# Patient Record
Sex: FEMALE | Race: OTHER | Employment: OTHER | URBAN - METROPOLITAN AREA
[De-identification: names, ages, dates, MRNs, and addresses within clinical notes are randomized per-mention and may not be internally consistent; named-entity substitution may affect disease eponyms.]

---

## 2024-05-21 PROBLEM — E66.01 SEVERE OBESITY (BMI 35.0-39.9) WITH COMORBIDITY (HCC): Status: ACTIVE | Noted: 2024-05-21

## 2024-06-07 NOTE — PATIENT INSTRUCTIONS
Thank you for coming to see me today   Continue to be active   No changes to your medications   Clearance letter sent for surgery to Dr. Westbrook

## 2024-06-07 NOTE — PROGRESS NOTES
[facility] [place]  [facility]  Cardiology Note  [phone]      No chief complaint on file.       History of Present Illness:  Yoli Mejía is a 43 y.o. patient PMHx obesity. Pt referred to the office by Dr. Westbrook for cardiac risk assessment for upcoming bariatric surgery. I have been asked to provide consultation regarding further management and testing.    Today she is here with her daughter. Use of video  for visit. She states she walks the dog daily, approximately 1 mile, taking 30 minutes. She feels short of breath with walking and the 3 flights of stairs to her apartment but is able to complete without needing to stop for rest. Denies chest pain or pressure.       ID #: [id number]    Past Medical History:   has no past medical history on file.    Surgical History:   has no past surgical history on file.     Social History:   reports that she has never smoked. She has never used smokeless tobacco.     Family History:  Family History   Problem Relation Age of Onset    Hypertension Mother     Mental Illness Mother     Cancer Father        Home Medications:  Were reviewed and are listed in nursing record. and/or listed below  Prior to Admission medications    Medication Sig Start Date End Date Taking? Authorizing Provider   vitamin B-12 (CYANOCOBALAMIN) 1000 MCG tablet Take 1 tablet by mouth daily    Boo Valverde MD   Biotin 95004 MCG TABS Take by mouth    Boo Valverde MD   Magnesium 300 MG CAPS Take by mouth    Boo Valverde MD   Apple Cider Vinegar 600 MG CAPS Take by mouth    Boo Valverde MD        Current Medications:  Current Outpatient Medications   Medication Sig Dispense Refill    vitamin B-12 (CYANOCOBALAMIN) 1000 MCG tablet Take 1 tablet by mouth daily      Biotin 87998 MCG TABS Take by mouth      Magnesium 300 MG CAPS Take by mouth      Apple Cider Vinegar 600 MG CAPS Take by mouth       No current facility-administered 
  Patient will be started on new medication ***.  Patient verbalizes understanding of the need for treatment and education provided at today's visit. Additional education materials will be provided in the AVS.     Scribe***    All questions and concerns were addressed to the patient/family. Alternatives to my treatment were discussed. The note was completed using EMR. Every effort was made to ensure accuracy; however, inadvertent computerized transcription errors may be present.  Mary Alcocer RN, MD 5/23/2024 3:01 PM

## 2024-06-11 ENCOUNTER — OFFICE VISIT (OUTPATIENT)
Age: 44
End: 2024-06-11
Payer: COMMERCIAL

## 2024-06-11 VITALS
HEIGHT: 62 IN | DIASTOLIC BLOOD PRESSURE: 72 MMHG | BODY MASS INDEX: 37.57 KG/M2 | WEIGHT: 204.15 LBS | HEART RATE: 93 BPM | OXYGEN SATURATION: 98 % | SYSTOLIC BLOOD PRESSURE: 126 MMHG

## 2024-06-11 DIAGNOSIS — Z01.818 PREOPERATIVE CLEARANCE: Primary | ICD-10-CM

## 2024-06-11 DIAGNOSIS — E66.01 SEVERE OBESITY (BMI 35.0-39.9) WITH COMORBIDITY (HCC): ICD-10-CM

## 2024-06-11 PROCEDURE — 93000 ELECTROCARDIOGRAM COMPLETE: CPT | Performed by: INTERNAL MEDICINE

## 2024-06-11 PROCEDURE — 99204 OFFICE O/P NEW MOD 45 MIN: CPT | Performed by: INTERNAL MEDICINE

## 2024-06-20 PROBLEM — Z01.818 PREOPERATIVE CLEARANCE: Status: RESOLVED | Noted: 2024-05-21 | Resolved: 2024-06-20

## 2024-06-25 ENCOUNTER — OFFICE VISIT (OUTPATIENT)
Dept: BARIATRICS/WEIGHT MGMT | Age: 44
End: 2024-06-25
Payer: COMMERCIAL

## 2024-06-25 VITALS
WEIGHT: 198 LBS | HEART RATE: 69 BPM | RESPIRATION RATE: 15 BRPM | OXYGEN SATURATION: 97 % | BODY MASS INDEX: 35.08 KG/M2 | DIASTOLIC BLOOD PRESSURE: 85 MMHG | HEIGHT: 63 IN | SYSTOLIC BLOOD PRESSURE: 126 MMHG

## 2024-06-25 DIAGNOSIS — R73.03 PREDIABETES: ICD-10-CM

## 2024-06-25 DIAGNOSIS — E66.01 SEVERE OBESITY (BMI 35.0-39.9) WITH COMORBIDITY (HCC): Primary | ICD-10-CM

## 2024-06-25 PROCEDURE — 1036F TOBACCO NON-USER: CPT | Performed by: SURGERY

## 2024-06-25 PROCEDURE — G8417 CALC BMI ABV UP PARAM F/U: HCPCS | Performed by: SURGERY

## 2024-06-25 PROCEDURE — 99214 OFFICE O/P EST MOD 30 MIN: CPT | Performed by: SURGERY

## 2024-06-25 PROCEDURE — G8427 DOCREV CUR MEDS BY ELIG CLIN: HCPCS | Performed by: SURGERY

## 2024-06-25 RX ORDER — FERROUS SULFATE 325(65) MG
325 TABLET ORAL 2 TIMES DAILY WITH MEALS
Qty: 60 TABLET | Refills: 3 | Status: SHIPPED | OUTPATIENT
Start: 2024-06-25

## 2024-06-25 NOTE — PATIENT INSTRUCTIONS
Patient received dietary handouts and education.    Plan/Recommendations:   - Eat 4 times/day spaced 3 hours apart, start 1 within 2 hours of waking up, refer to snack list for ideas  - Try Protein Powder  - Schedule Support Group

## 2024-06-25 NOTE — PROGRESS NOTES
Yoli Calderon lost 6.2 lbs over 5 weeks. Visits complete via video interpretor Jan #[id number].    Pt reports continues to follow fasting schedule, only eating from 1 PM -8 PM  Breakfast: None - not hungry  Snack: None  Lunch: None  Snack: None   Dinner: 5 PM: Fiber tortilla chix/cheese  Snack: None    Is pt consuming smaller portions?  somewhat    Is pt consuming at least 64 oz of fluids per day?  6 bottles  water    Is pt consuming carbonated, caffeinated, or sugary beverages? Avoiding all juice    Has pt sampled Unjury and/or Nectar protein? Encouraged pt to try grab'n'go's before next appointment.    Has patient attended a support group? Not scheduled     Exercise: walking/active cleaning houses     Plan/Recommendations:   - Eat 4 times/day spaced 3 hours apart, start 1 within 2 hours of waking up, refer to snack list for ideas  - Try Protein Powder  - Schedule Support Group    Handouts: None    Yoana Jacob RD, LD    
mood and affect. Speech is normal and behavior is normal.       A/P    Yoli Calderon is 43 y.o. female, Body mass index is 35.64 kg/m². pre surgery, has lost 6 lbs since last visit. The patient underwent extensive dietary counseling.  I have reviewed, discussed and agree with the dietary plan by the registered dietitian . Patient is trying hard to keep good dietary and behavior modifications. Patient is monitoring portion sizes, food choices and liquid calories.  Patient is trying to exercise regularly as much as possible.    Obesity as a disease is considered a high risk to patients overall health and should therefore be considered a high risk disease state.   Advised the patient that not getting there weight under control, that could increase risk of complications/worsening of those conditions on the long-term. (Goal of weight loss surgery is to alleviate/control some of those co-morbidities)    Now with Covid-19 pandemic, CDC and health authorities does classify obese patients as vulnerable and high risk as well.  Which makes weight loss a priority for improvement of their wellbeing and overall health.     I encouraged the patient to continue exercise and keeping healthy eating habits.  Discussed pre-op labs and work up till now. Also counseled the patient extensively on Surgery.     I did explain thoroughly to the patient that compliance with pre- and post op diet and other recommendations are integral part to improve the chances of successful weight loss and also not following it could end with serious health complications.   Some strategies discussed today include but not limited to : 30/30/30 minutes rule, food diary, avoid fast food and packing/planing ahead, & increasing exercise.    Also stressed to the patient importance of taking the multivitamins as instructed, otherwise risk significant complications.      The visit today, included any number of the following: Bariatric Preoperative work

## 2024-07-12 PROBLEM — E55.9 VITAMIN D DEFICIENCY: Status: ACTIVE | Noted: 2024-07-12

## 2024-07-12 PROBLEM — E61.1 IRON DEFICIENCY: Status: ACTIVE | Noted: 2024-07-12

## 2024-07-12 NOTE — PATIENT INSTRUCTIONS
Goals in preparing for bariatric surgery  You should be giving up all beverages that have carbonation, sugar, and caffeine (Refer to the approved liquids list provided at initial visit).  You should be drinking 64 ounces of low calorie (5 calories or less per serving) fluids per day. Suggestions include:  Water (you may add fresh lemon or lime)  Crystal Light  [place] Liquid Water Enhancer  Propel Zero  Powerade Zero/Gatorade Zero  Isopure  Cnefe4X  SOBE Lifewater Zero  Vitamin Water Zero  Sugar Free Padilla-Aid  You should be eating 4-6 times per day.  Three small meals plus 1-2 snacks per day is your goal. This balances your calories and nutrients evenly throughout the day and helps to boost your metabolism. Refer to the snack list provided at your initial visit. Aim for a protein at every snack, plus a fruit, vegetable or starch.  You should be eating protein at every meal and snack.  Protein is typically found in animal sources, i.e. chicken, lean beef, lean pork, fish, seafood and eggs. It is also found in low-fat dairy sources such as skim or 1% milk, low-fat yogurt, low-fat cheese, and low-fat cottage cheese. Plant based sources of protein include peanut butter, beans, and soy.  You should be utilizing the 9-inch plate method.  Eating on a smaller plate will help you control portion size, but what you put on your plate counts also. Make ¼ of your plate lean protein, ¼ carbohydrate (fruit, grain or starchy vegetables) and ½ the plate non-starchy vegetables.  You should eliminate caffeine.  Caffeine is dehydrating. After surgery, it's very important to stay hydrated. Giving up caffeine before surgery will help you focus on the changes necessary to be successful after surgery. There are many decaffeinated coffee and tea products available in grocery stores.    You should eliminate alcohol.  You must abstain from alcohol prior to surgery and for at least the first 6-9 months after surgery. Although you may have alcohol

## 2024-07-29 ENCOUNTER — OFFICE VISIT (OUTPATIENT)
Dept: BARIATRICS/WEIGHT MGMT | Age: 44
End: 2024-07-29
Payer: COMMERCIAL

## 2024-07-29 VITALS
WEIGHT: 199 LBS | HEIGHT: 63 IN | OXYGEN SATURATION: 96 % | RESPIRATION RATE: 16 BRPM | DIASTOLIC BLOOD PRESSURE: 78 MMHG | SYSTOLIC BLOOD PRESSURE: 120 MMHG | BODY MASS INDEX: 35.26 KG/M2 | HEART RATE: 65 BPM

## 2024-07-29 DIAGNOSIS — E55.9 VITAMIN D DEFICIENCY: ICD-10-CM

## 2024-07-29 DIAGNOSIS — E61.1 IRON DEFICIENCY: ICD-10-CM

## 2024-07-29 DIAGNOSIS — E66.01 SEVERE OBESITY (BMI 35.0-39.9) WITH COMORBIDITY (HCC): ICD-10-CM

## 2024-07-29 DIAGNOSIS — R73.03 PREDIABETES: Primary | ICD-10-CM

## 2024-07-29 PROCEDURE — G8417 CALC BMI ABV UP PARAM F/U: HCPCS | Performed by: NURSE PRACTITIONER

## 2024-07-29 PROCEDURE — 99214 OFFICE O/P EST MOD 30 MIN: CPT | Performed by: NURSE PRACTITIONER

## 2024-07-29 PROCEDURE — G8427 DOCREV CUR MEDS BY ELIG CLIN: HCPCS | Performed by: NURSE PRACTITIONER

## 2024-07-29 PROCEDURE — 1036F TOBACCO NON-USER: CPT | Performed by: NURSE PRACTITIONER

## 2024-07-29 NOTE — PROGRESS NOTES
Yoli Calderon gained 1 lbs over 1 mos.     Is pt eating at least 4 times everyday?  3X/ day  - states that she eats less than 1000 kcal/day. Discussed tracking with MyFitnessPal   B: chocolate protein OR eggs OR hamburger from [employer] (once per month)  L: salad chik dotty a OR quesadilla   D: vanilla or chocolate protein OR protein chx/fish/beef     Is pt eating a lean protein source with all meals and snacks? yes      Has pt decreased their portions using the plate method? yes states she is eating less    Is pt choosing low fat/sugar free options?  Some takeout, but most of the time    Is pt drinking at least 64 oz of clear liquids everyday?  4-5 16oz water bottles     Has pt stopped drinking carbonation, caffeinated, and sugar sweetened beverages? yes would do CL     Has pt sampled Unjury and/or Nectar protein? yes   chocolate and vanilla liked and tolerated     Has pt attended a support group? Scheduled 8/8    Participating in intentional exercise? yes walking all day at work,     Plan/Recommendations:   - Aim for 2070-7350 calories per day, track using MyFitnessPal  - Avoid/limit takeout  - Attend SG    Handouts: none     GALILEA ABDI, MS, RD, LD  
encouraged to discuss possible contraceptive methods with their PCP or OBGYN. Patient counseled on the avoidance of tobacco/nicotine, alcohol and illicit drug abuse. Discussed preop work up which still needs EGD (schedule today), psych evaluation (schedule today), weight history, support group, PCP letter, drug/alcohol/nicotine screening, pregnancy test and protein sample. We will see her back in 1 month for continued follow up.     Prediabetes:   [x] Continue to make dietary and lifestyle modifications per our recommendations.  [x] Weight loss recommended.  [] Reviewed the importance of checking blood sugars.  [] Continue to follow up with their PCP for medication management and monitoring.  [] Follow up labs ordered today.    Vitamin D Deficiency:  [x] Continue to make dietary and lifestyle modifications per our recommendations.  [x] Continue to take Vitamin D supplements.  [] Continue to take multivitamins.  [] Follow up labs ordered today.    Iron Deficiency:  [x] Continue to make dietary and lifestyle modifications per our recommendations.  [x] Continue to take iron supplements.  [] Continue to take multivitamins.  [] Follow up labs ordered today.    Obesity:  [x] Continue to make dietary and lifestyle modifications per our recommendations.  [x] Weight loss recommended.  [x] Plan for Future laparoscopic sleeve gastrectomy.  [x] Return for follow-up next month.    Total encounter time: 33 minutes, including any number of the following: Bariatric Preoperative work up/protocols, review of labs, imaging, provider notes, outside hospital records, performing examination/evaluation, counseling patient and/or family, ordering medications/tests, placing referrals and communication with referring physicians, coordination of care; discussing exercise and physical activity; discussing dietary plan/recall with the patient as well with registered dietitian and documentation in the EHR. Of note, the above was done during same

## 2024-08-27 ENCOUNTER — OFFICE VISIT (OUTPATIENT)
Dept: BARIATRICS/WEIGHT MGMT | Age: 44
End: 2024-08-27
Payer: COMMERCIAL

## 2024-08-27 ENCOUNTER — INITIAL CONSULT (OUTPATIENT)
Dept: BARIATRICS/WEIGHT MGMT | Age: 44
End: 2024-08-27
Payer: COMMERCIAL

## 2024-08-27 ENCOUNTER — PREP FOR PROCEDURE (OUTPATIENT)
Dept: BARIATRICS/WEIGHT MGMT | Age: 44
End: 2024-08-27

## 2024-08-27 VITALS
HEIGHT: 63 IN | WEIGHT: 200 LBS | HEART RATE: 70 BPM | SYSTOLIC BLOOD PRESSURE: 133 MMHG | BODY MASS INDEX: 35.44 KG/M2 | RESPIRATION RATE: 16 BRPM | OXYGEN SATURATION: 96 % | DIASTOLIC BLOOD PRESSURE: 76 MMHG

## 2024-08-27 DIAGNOSIS — R73.03 PREDIABETES: ICD-10-CM

## 2024-08-27 DIAGNOSIS — E66.01 SEVERE OBESITY (BMI 35.0-39.9) WITH COMORBIDITY (HCC): Primary | ICD-10-CM

## 2024-08-27 DIAGNOSIS — E66.01 SEVERE OBESITY (BMI 35.0-39.9) WITH COMORBIDITY (HCC): ICD-10-CM

## 2024-08-27 DIAGNOSIS — F43.22 ADJUSTMENT REACTION WITH ANXIETY: Primary | ICD-10-CM

## 2024-08-27 DIAGNOSIS — K21.9 CHRONIC GERD: ICD-10-CM

## 2024-08-27 PROCEDURE — G8417 CALC BMI ABV UP PARAM F/U: HCPCS | Performed by: SURGERY

## 2024-08-27 PROCEDURE — 96130 PSYCL TST EVAL PHYS/QHP 1ST: CPT | Performed by: PSYCHOLOGIST

## 2024-08-27 PROCEDURE — 90791 PSYCH DIAGNOSTIC EVALUATION: CPT | Performed by: PSYCHOLOGIST

## 2024-08-27 PROCEDURE — 99214 OFFICE O/P EST MOD 30 MIN: CPT | Performed by: SURGERY

## 2024-08-27 PROCEDURE — 96136 PSYCL/NRPSYC TST PHY/QHP 1ST: CPT | Performed by: PSYCHOLOGIST

## 2024-08-27 PROCEDURE — G8427 DOCREV CUR MEDS BY ELIG CLIN: HCPCS | Performed by: SURGERY

## 2024-08-27 PROCEDURE — 1036F TOBACCO NON-USER: CPT | Performed by: SURGERY

## 2024-08-27 PROCEDURE — 1036F TOBACCO NON-USER: CPT | Performed by: PSYCHOLOGIST

## 2024-08-27 RX ORDER — SODIUM CHLORIDE 0.9 % (FLUSH) 0.9 %
5-40 SYRINGE (ML) INJECTION PRN
Status: CANCELLED | OUTPATIENT
Start: 2024-08-27

## 2024-08-27 RX ORDER — SODIUM CHLORIDE 0.9 % (FLUSH) 0.9 %
5-40 SYRINGE (ML) INJECTION EVERY 12 HOURS SCHEDULED
Status: CANCELLED | OUTPATIENT
Start: 2024-08-27

## 2024-08-27 RX ORDER — SODIUM CHLORIDE 9 MG/ML
25 INJECTION, SOLUTION INTRAVENOUS PRN
Status: CANCELLED | OUTPATIENT
Start: 2024-08-27

## 2024-08-27 NOTE — PROGRESS NOTES
[facility]   [facility]  Mc Westbrook MD, FACS, [address], [address]    [facility] [address] .  Phone: [phone]  Fax: [phone]          Chief Complaint   Patient presents with    Bariatric, Initial Visit     4th pre surg         HPI:     Yoli Calderon is a very pleasant 43 y.o. female with Body mass index is 36 kg/m². / Chronic Obesity.     Yoli has been struggling for several years now with obesity. Yoli feels the weight is an obstacle to achieve and perform things in daily living as well risk on health.     Patient  is very determined to lose weight and be healthy, and is working towards  surgical weight loss to achieve this goal. Pre-operative clearance and work up pending. Working hard to keep good dietary habits as well level of activity.  Patient denies any nausea, vomiting, fevers, chills, shortness of breath, chest pain, cough, constipation or difficulty urinating.    Pain Assessment   Denies any abdominal pain       History reviewed. No pertinent past medical history.  History reviewed. No pertinent surgical history.  Family History   Problem Relation Age of Onset    Hypertension Mother     Mental Illness Mother     Cancer Father      Social History     Tobacco Use    Smoking status: Never    Smokeless tobacco: Never   Substance Use Topics    Alcohol use: Not on file     I counseled the patient on the importance of not smoking and risks of ETOH.   No Known Allergies  Vitals:    08/27/24 0922   BP: 133/76   Site: Right Upper Arm   Pulse: 70   Resp: 16   SpO2: 96%   Weight: 90.7 kg (200 lb)   Height: 1.588 m (5' 2.5\")       Body mass index is 36 kg/m².    Lab Results   Component Value Date/Time    WBC 5.3 05/21/2024 11:58 AM    RBC 5.18 05/21/2024 11:58 AM    HGB 12.8 05/21/2024 11:58 AM    HCT 38.7 05/21/2024 11:58 AM    MCV 74.7 05/21/2024 11:58 AM    MCH 24.7 05/21/2024 11:58 AM    MCHC 33.0 05/21/2024 11:58 AM    MPV 9.7 05/21/2024

## 2024-08-27 NOTE — PROGRESS NOTES
Yoli Calderon gained 1.0 lbs over ~month.     Breakfast: 3 eggs and 2 toast with cheese  Snack: SF jello and string cheese  Lunch: rice and beans with chicken/steak/shrimp/salmon  Snack: none OR string cheese  Dinner: chicken from [employer] and mac and cheese and mashed potatoes (discussed)  Snack: none    Is pt consuming smaller portions? yes      Is pt consuming at least 64 oz of fluids per day? yes 5 bottles of CL and water     Is pt consuming carbonated, caffeinated, or sugary beverages? no    Has pt sampled Unjury and/or Nectar protein? Yes - liked chocolate and vanilla     Has patient attended a support group? Not scheduled  (did not attend 8/8 SG)    Exercise: 2x per week 1 hour goes to gym; works as house keeping for job     Plan/Recommendations:   - attend SG  - focus on other options IF needing to eat out     Handouts: none    Mei Lopez, RD, LD

## 2024-08-27 NOTE — PROGRESS NOTES
Yoli Calderon presented for her presurgical psychological evaluation on 08/27/2024. The patient is pursuing bariatric surgery secondary to a medical diagnosis of severe obesity. The evaluation consisted of a clinical interview, the Eating Habits Checklist, the Binge Eating Disorder Screener - 7 (BEDS-7), and the Symptom Roitaovmx-82-S (SCL-90-R) administered by the provider. A  was present during the evaluation at the patient's request.     Based on the evaluation, Yoli Calderon is considered to be an appropriate candidate for bariatric surgery from a psychological standpoint. She exhibits mild anxiety secondary to her health and impending surgery, but otherwise reports no significant history of mental health concerns. She does not recall ever having been prescribed psychotropic medication. She participated briefly in counseling approximately 18 years ago when her daughter was diagnosed with Type 1 diabetes, but reports no other history of psychological intervention. She denies a history of significant depression, including suicidal ideation or suicide attempts. She has never been hospitalized psychiatrically. She denies a history of chemical abuse or dependence. She is a non-smoker. She denies alcohol and any other recreational or illicit drug use.     Yoli Calderon has never been diagnosed with an eating disorder, and her responses in the interview and on the Eating Habits Checklist and the BEDS-7 do not warrant a clinical diagnosis. She denies eating in response to emotional stress and boredom. She reports a long history of skipping regular meals, but states she is currently eating at least four small meals and/or snacks consistently throughout her day. She is attending to portion control. She has eliminated caffeine and carbonated beverages from her diet. She reports drinking an adequate daily intake of water. She endorsed mildly self-punitive thoughts and

## 2024-09-09 ENCOUNTER — TELEPHONE (OUTPATIENT)
Dept: BARIATRICS/WEIGHT MGMT | Age: 44
End: 2024-09-09

## 2024-09-13 ENCOUNTER — HOSPITAL ENCOUNTER (OUTPATIENT)
Age: 44
Setting detail: OUTPATIENT SURGERY
Discharge: HOME | End: 2024-09-13
Attending: SURGERY | Admitting: SURGERY

## 2024-09-13 ENCOUNTER — ANESTHESIA EVENT (OUTPATIENT)
Dept: ENDOSCOPY | Age: 44
End: 2024-09-13

## 2024-09-13 ENCOUNTER — ANESTHESIA (OUTPATIENT)
Dept: ENDOSCOPY | Age: 44
End: 2024-09-13

## 2024-09-13 VITALS
OXYGEN SATURATION: 96 % | BODY MASS INDEX: 36.51 KG/M2 | SYSTOLIC BLOOD PRESSURE: 106 MMHG | DIASTOLIC BLOOD PRESSURE: 64 MMHG | HEART RATE: 85 BPM | RESPIRATION RATE: 14 BRPM | HEIGHT: 62 IN | WEIGHT: 198.4 LBS | TEMPERATURE: 98 F

## 2024-09-13 DIAGNOSIS — K21.9 CHRONIC GERD: ICD-10-CM

## 2024-09-13 PROBLEM — K44.9 HIATAL HERNIA: Status: ACTIVE | Noted: 2024-09-13

## 2024-09-13 LAB
GLUCOSE BLD-MCNC: 87 MG/DL (ref 70–99)
HCG UR QL: NEGATIVE
PERFORMED ON: NORMAL

## 2024-09-13 PROCEDURE — 84703 CHORIONIC GONADOTROPIN ASSAY: CPT

## 2024-09-13 PROCEDURE — 6360000002 HC RX W HCPCS: Performed by: NURSE ANESTHETIST, CERTIFIED REGISTERED

## 2024-09-13 PROCEDURE — 7100000010 HC PHASE II RECOVERY - FIRST 15 MIN: Performed by: SURGERY

## 2024-09-13 PROCEDURE — 88305 TISSUE EXAM BY PATHOLOGIST: CPT

## 2024-09-13 PROCEDURE — 3700000001 HC ADD 15 MINUTES (ANESTHESIA): Performed by: SURGERY

## 2024-09-13 PROCEDURE — 2709999900 HC NON-CHARGEABLE SUPPLY: Performed by: SURGERY

## 2024-09-13 PROCEDURE — 88342 IMHCHEM/IMCYTCHM 1ST ANTB: CPT

## 2024-09-13 PROCEDURE — 3609012400 HC EGD TRANSORAL BIOPSY SINGLE/MULTIPLE: Performed by: SURGERY

## 2024-09-13 PROCEDURE — 3700000000 HC ANESTHESIA ATTENDED CARE: Performed by: SURGERY

## 2024-09-13 PROCEDURE — 7100000011 HC PHASE II RECOVERY - ADDTL 15 MIN: Performed by: SURGERY

## 2024-09-13 PROCEDURE — 2580000003 HC RX 258: Performed by: SURGERY

## 2024-09-13 PROCEDURE — 2500000003 HC RX 250 WO HCPCS: Performed by: NURSE ANESTHETIST, CERTIFIED REGISTERED

## 2024-09-13 RX ORDER — SODIUM CHLORIDE 0.9 % (FLUSH) 0.9 %
5-40 SYRINGE (ML) INJECTION EVERY 12 HOURS SCHEDULED
Status: DISCONTINUED | OUTPATIENT
Start: 2024-09-13 | End: 2024-09-13 | Stop reason: HOSPADM

## 2024-09-13 RX ORDER — PROPOFOL 10 MG/ML
INJECTION, EMULSION INTRAVENOUS
Status: DISCONTINUED | OUTPATIENT
Start: 2024-09-13 | End: 2024-09-13 | Stop reason: SDUPTHER

## 2024-09-13 RX ORDER — GLYCOPYRROLATE 0.2 MG/ML
INJECTION INTRAMUSCULAR; INTRAVENOUS
Status: DISCONTINUED | OUTPATIENT
Start: 2024-09-13 | End: 2024-09-13 | Stop reason: SDUPTHER

## 2024-09-13 RX ORDER — SODIUM CHLORIDE 9 MG/ML
25 INJECTION, SOLUTION INTRAVENOUS PRN
Status: DISCONTINUED | OUTPATIENT
Start: 2024-09-13 | End: 2024-09-13 | Stop reason: HOSPADM

## 2024-09-13 RX ORDER — SODIUM CHLORIDE 0.9 % (FLUSH) 0.9 %
5-40 SYRINGE (ML) INJECTION PRN
Status: DISCONTINUED | OUTPATIENT
Start: 2024-09-13 | End: 2024-09-13 | Stop reason: HOSPADM

## 2024-09-13 RX ORDER — LIDOCAINE HYDROCHLORIDE 20 MG/ML
INJECTION, SOLUTION EPIDURAL; INFILTRATION; INTRACAUDAL; PERINEURAL
Status: DISCONTINUED | OUTPATIENT
Start: 2024-09-13 | End: 2024-09-13 | Stop reason: SDUPTHER

## 2024-09-13 RX ORDER — DEXMEDETOMIDINE HYDROCHLORIDE 100 UG/ML
INJECTION, SOLUTION INTRAVENOUS
Status: DISCONTINUED | OUTPATIENT
Start: 2024-09-13 | End: 2024-09-13 | Stop reason: SDUPTHER

## 2024-09-13 RX ADMIN — PROPOFOL 50 MG: 10 INJECTION, EMULSION INTRAVENOUS at 10:24

## 2024-09-13 RX ADMIN — PROPOFOL 70 MG: 10 INJECTION, EMULSION INTRAVENOUS at 10:16

## 2024-09-13 RX ADMIN — GLYCOPYRROLATE 0.2 MG: 0.2 INJECTION, SOLUTION INTRAMUSCULAR; INTRAVENOUS at 10:15

## 2024-09-13 RX ADMIN — PROPOFOL 40 MG: 10 INJECTION, EMULSION INTRAVENOUS at 10:18

## 2024-09-13 RX ADMIN — PROPOFOL 40 MG: 10 INJECTION, EMULSION INTRAVENOUS at 10:19

## 2024-09-13 RX ADMIN — SODIUM CHLORIDE 1000 ML: 9 INJECTION, SOLUTION INTRAVENOUS at 09:46

## 2024-09-13 RX ADMIN — PROPOFOL 50 MG: 10 INJECTION, EMULSION INTRAVENOUS at 10:29

## 2024-09-13 RX ADMIN — LIDOCAINE HYDROCHLORIDE 100 MG: 20 INJECTION, SOLUTION EPIDURAL; INFILTRATION; INTRACAUDAL; PERINEURAL at 10:16

## 2024-09-13 RX ADMIN — PROPOFOL 50 MG: 10 INJECTION, EMULSION INTRAVENOUS at 10:17

## 2024-09-13 RX ADMIN — DEXMEDETOMIDINE HYDROCHLORIDE 5 MCG: 100 INJECTION, SOLUTION INTRAVENOUS at 10:15

## 2024-09-13 RX ADMIN — DEXMEDETOMIDINE HYDROCHLORIDE 5 MCG: 100 INJECTION, SOLUTION INTRAVENOUS at 10:24

## 2024-09-13 ASSESSMENT — PAIN - FUNCTIONAL ASSESSMENT: PAIN_FUNCTIONAL_ASSESSMENT: 0-10

## 2024-09-18 DIAGNOSIS — K21.9 CHRONIC GERD: Primary | ICD-10-CM

## 2024-09-18 DIAGNOSIS — Z01.818 PREOPERATIVE CLEARANCE: ICD-10-CM

## 2024-09-18 RX ORDER — AMOXICILLIN 500 MG/1
1000 CAPSULE ORAL 2 TIMES DAILY
Qty: 56 CAPSULE | Refills: 0 | Status: SHIPPED | OUTPATIENT
Start: 2024-09-18 | End: 2024-10-02

## 2024-09-18 RX ORDER — CLARITHROMYCIN 500 MG
500 TABLET ORAL 2 TIMES DAILY
Qty: 28 TABLET | Refills: 0 | Status: SHIPPED | OUTPATIENT
Start: 2024-09-18 | End: 2024-10-02

## 2024-09-24 ENCOUNTER — OFFICE VISIT (OUTPATIENT)
Dept: BARIATRICS/WEIGHT MGMT | Age: 44
End: 2024-09-24
Payer: COMMERCIAL

## 2024-09-24 ENCOUNTER — HOSPITAL ENCOUNTER (OUTPATIENT)
Age: 44
Discharge: HOME | End: 2024-09-24
Payer: COMMERCIAL

## 2024-09-24 VITALS
DIASTOLIC BLOOD PRESSURE: 70 MMHG | OXYGEN SATURATION: 100 % | SYSTOLIC BLOOD PRESSURE: 110 MMHG | HEART RATE: 65 BPM | BODY MASS INDEX: 35.97 KG/M2 | HEIGHT: 63 IN | WEIGHT: 203 LBS | RESPIRATION RATE: 18 BRPM

## 2024-09-24 DIAGNOSIS — E66.01 SEVERE OBESITY (BMI 35.0-39.9) WITH COMORBIDITY: ICD-10-CM

## 2024-09-24 DIAGNOSIS — K29.70 HELICOBACTER PYLORI GASTRITIS: ICD-10-CM

## 2024-09-24 DIAGNOSIS — K44.9 HIATAL HERNIA: ICD-10-CM

## 2024-09-24 DIAGNOSIS — R73.03 PREDIABETES: ICD-10-CM

## 2024-09-24 DIAGNOSIS — K21.9 CHRONIC GERD: ICD-10-CM

## 2024-09-24 DIAGNOSIS — B96.81 HELICOBACTER PYLORI GASTRITIS: ICD-10-CM

## 2024-09-24 DIAGNOSIS — E66.01 SEVERE OBESITY (BMI 35.0-39.9) WITH COMORBIDITY: Primary | ICD-10-CM

## 2024-09-24 LAB
AMPHETAMINES UR QL SCN>1000 NG/ML: NORMAL
BARBITURATES UR QL SCN>200 NG/ML: NORMAL
BENZODIAZ UR QL SCN>200 NG/ML: NORMAL
CANNABINOIDS UR QL SCN>50 NG/ML: NORMAL
COCAINE UR QL SCN: NORMAL
DRUG SCREEN COMMENT UR-IMP: NORMAL
ETHANOLAMINE SERPL-MCNC: 10 MG/DL (ref 0–0.08)
FENTANYL SCREEN, URINE: NORMAL
HCG UR QL: NEGATIVE
METHADONE UR QL SCN>300 NG/ML: NORMAL
OPIATES UR QL SCN>300 NG/ML: NORMAL
OXYCODONE UR QL SCN: NORMAL
PCP UR QL SCN>25 NG/ML: NORMAL
PH UR STRIP: 5 [PH]

## 2024-09-24 PROCEDURE — 99214 OFFICE O/P EST MOD 30 MIN: CPT | Performed by: SURGERY

## 2024-09-24 PROCEDURE — G0480 DRUG TEST DEF 1-7 CLASSES: HCPCS

## 2024-09-24 PROCEDURE — 84703 CHORIONIC GONADOTROPIN ASSAY: CPT

## 2024-09-24 PROCEDURE — 1036F TOBACCO NON-USER: CPT | Performed by: SURGERY

## 2024-09-24 PROCEDURE — G8427 DOCREV CUR MEDS BY ELIG CLIN: HCPCS | Performed by: SURGERY

## 2024-09-24 PROCEDURE — G2211 COMPLEX E/M VISIT ADD ON: HCPCS | Performed by: SURGERY

## 2024-09-24 PROCEDURE — 82077 ASSAY SPEC XCP UR&BREATH IA: CPT

## 2024-09-24 PROCEDURE — 36415 COLL VENOUS BLD VENIPUNCTURE: CPT

## 2024-09-24 PROCEDURE — G8417 CALC BMI ABV UP PARAM F/U: HCPCS | Performed by: SURGERY

## 2024-09-24 PROCEDURE — 80307 DRUG TEST PRSMV CHEM ANLYZR: CPT

## 2024-09-28 LAB
COTININE SERPL-MCNC: <5 NG/ML
NICOTINE SERPL-MCNC: <5 NG/ML

## 2024-10-22 ENCOUNTER — OFFICE VISIT (OUTPATIENT)
Dept: BARIATRICS/WEIGHT MGMT | Age: 44
End: 2024-10-22
Payer: COMMERCIAL

## 2024-10-22 VITALS
WEIGHT: 208 LBS | HEIGHT: 63 IN | OXYGEN SATURATION: 97 % | DIASTOLIC BLOOD PRESSURE: 78 MMHG | HEART RATE: 79 BPM | RESPIRATION RATE: 18 BRPM | SYSTOLIC BLOOD PRESSURE: 124 MMHG | BODY MASS INDEX: 36.86 KG/M2

## 2024-10-22 DIAGNOSIS — R73.03 PREDIABETES: ICD-10-CM

## 2024-10-22 DIAGNOSIS — K21.9 CHRONIC GERD: ICD-10-CM

## 2024-10-22 DIAGNOSIS — K44.9 HIATAL HERNIA: ICD-10-CM

## 2024-10-22 DIAGNOSIS — E66.01 SEVERE OBESITY (BMI 35.0-39.9) WITH COMORBIDITY: Primary | ICD-10-CM

## 2024-10-22 PROCEDURE — 99214 OFFICE O/P EST MOD 30 MIN: CPT | Performed by: SURGERY

## 2024-10-22 PROCEDURE — 1036F TOBACCO NON-USER: CPT | Performed by: SURGERY

## 2024-10-22 PROCEDURE — G8427 DOCREV CUR MEDS BY ELIG CLIN: HCPCS | Performed by: SURGERY

## 2024-10-22 PROCEDURE — G8484 FLU IMMUNIZE NO ADMIN: HCPCS | Performed by: SURGERY

## 2024-10-22 PROCEDURE — G2211 COMPLEX E/M VISIT ADD ON: HCPCS | Performed by: SURGERY

## 2024-10-22 PROCEDURE — G8417 CALC BMI ABV UP PARAM F/U: HCPCS | Performed by: SURGERY

## 2024-10-22 NOTE — PROGRESS NOTES
[facility]   [facility]  Mc Westbrook MD, FACS, [address], [address]    [facility] [address] .  Phone: [phone]  Fax: [phone]        HPI:     Yoli Calderon is a very pleasant 44 y.o. female with Body mass index is 37.44 kg/m²., Pre-Surgery for future weight loss.     Pre-operative clearance and work up done. Working hard to keep good dietary habits as well level of activity.  Patient denies any nausea, vomiting, fevers, chills, shortness of breath, chest pain, cough, constipation or difficulty urinating.    Pain Assessment   Denies any abdominal pain       No past medical history on file.  Past Surgical History:   Procedure Laterality Date    TUBAL LIGATION      UPPER GASTROINTESTINAL ENDOSCOPY N/A 9/13/2024    ESOPHAGOGASTRODUODENOSCOPY BIOPSY performed by Mc Westbrook MD at [facility] END[facility]     Family History   Problem Relation Age of Onset    Hypertension Mother     Mental Illness Mother     Cancer Father      Social History     Tobacco Use    Smoking status: Never    Smokeless tobacco: Never   Substance Use Topics    Alcohol use: Not on file     I counseled the patient on the importance of not smoking and risks of ETOH.   No Known Allergies  Vitals:    10/22/24 1002   BP: 124/78   Pulse: 79   Resp: 18   SpO2: 97%   Weight: 94.3 kg (208 lb)   Height: 1.588 m (5' 2.5\")       Body mass index is 37.44 kg/m².      Current Outpatient Medications:     omeprazole (PRILOSEC) 20 MG delayed release capsule, Take 1 capsule by mouth every morning (before breakfast), Disp: 90 capsule, Rfl: 1    Cholecalciferol 50 MCG (2000 UT) TABS, Take 1 tablet by mouth daily Take 1 tablet by mouth daily. Start this medication after you finish the weekly regimen, Disp: 90 tablet, Rfl: 2    ferrous sulfate (IRON 325) 325 (65 Fe) MG tablet, Take 1 tablet by mouth 2 times daily (with meals), Disp: 60 tablet, Rfl: 3    vitamin B-12 (CYANOCOBALAMIN) 1000 MCG tablet,

## 2024-10-22 NOTE — PROGRESS NOTES
Yoli Calderon gained 5 lbs over past month. Reports she has been eating more bread lately as her daughter brought some back on her recent visit.     Is pt eating at least 4 times everyday? Yes     Is pt eating a lean protein source with all meals and snacks? Mostly except smith   B - protein shake OR eggs with pork smith   S - cheese and crackers   L - chicken/salmon/beef with rice and beans OR salad with air fried chicken   S - sometimes - almonds   D - pasta with chicken with broccoli and cauliflower with tomato sauce     Has pt decreased their portions using the plate method? Pt feels her portions are small     Is pt choosing low fat/sugar free options? Overall except for smith     Is pt drinking at least 64 oz of clear liquids everyday? Yes     Has pt stopped drinking carbonation, caffeinated, and sugar sweetened beverages? Yes - drinks water and crystal light     Has pt sampled Unjury and/or Nectar protein? Tried and tolerated     Has pt attended a support group? Attended cooking class 10/14/24    Participating in intentional exercise? 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

## (undated) DEVICE — SOLUTION IRRIG 500ML STRL H2O NONPYROGENIC

## (undated) DEVICE — AIR/WATER CLEANING ADAPTER FOR OLYMPUS® GI ENDOSCOPE: Brand: BULLDOG®

## (undated) DEVICE — ENDOSCOPIC KIT 6X3/16 FT COLON W/ 1.1 OZ 2 GWN W/O BRSH

## (undated) DEVICE — MOUTHPIECE ENDOSCP L CTRL OPN AND SIDE PORTS DISP

## (undated) DEVICE — SINGLE USE AIR/WATER, SUCTION AND BIOPSY VALVES SET: Brand: ORCAPOD™

## (undated) DEVICE — FORCEPS BX L240CM WRK CHN 2.8MM STD CAP W/ NDL MIC MESH

## (undated) DEVICE — BW-412T DISP COMBO CLEANING BRUSH: Brand: SINGLE USE COMBINATION CLEANING BRUSH